# Patient Record
Sex: MALE | Race: WHITE | NOT HISPANIC OR LATINO | URBAN - METROPOLITAN AREA
[De-identification: names, ages, dates, MRNs, and addresses within clinical notes are randomized per-mention and may not be internally consistent; named-entity substitution may affect disease eponyms.]

---

## 2017-07-11 ENCOUNTER — OFFICE VISIT - RIVER FALLS (OUTPATIENT)
Dept: FAMILY MEDICINE | Facility: CLINIC | Age: 22
End: 2017-07-11

## 2017-07-11 ASSESSMENT — MIFFLIN-ST. JEOR: SCORE: 1663.71

## 2018-11-27 ENCOUNTER — OFFICE VISIT - RIVER FALLS (OUTPATIENT)
Dept: FAMILY MEDICINE | Facility: CLINIC | Age: 23
End: 2018-11-27

## 2019-04-22 ENCOUNTER — OFFICE VISIT - RIVER FALLS (OUTPATIENT)
Dept: FAMILY MEDICINE | Facility: CLINIC | Age: 24
End: 2019-04-22

## 2019-04-22 ASSESSMENT — MIFFLIN-ST. JEOR: SCORE: 1725.39

## 2019-05-02 ENCOUNTER — OFFICE VISIT - RIVER FALLS (OUTPATIENT)
Dept: FAMILY MEDICINE | Facility: CLINIC | Age: 24
End: 2019-05-02

## 2019-05-02 ASSESSMENT — MIFFLIN-ST. JEOR: SCORE: 1722.67

## 2019-09-19 ENCOUNTER — OFFICE VISIT - RIVER FALLS (OUTPATIENT)
Dept: FAMILY MEDICINE | Facility: CLINIC | Age: 24
End: 2019-09-19

## 2019-09-19 ASSESSMENT — MIFFLIN-ST. JEOR: SCORE: 1786.18

## 2022-02-11 VITALS
BODY MASS INDEX: 23.42 KG/M2 | HEIGHT: 70 IN | SYSTOLIC BLOOD PRESSURE: 120 MMHG | HEIGHT: 70 IN | SYSTOLIC BLOOD PRESSURE: 118 MMHG | TEMPERATURE: 99.3 F | BODY MASS INDEX: 23.34 KG/M2 | WEIGHT: 163 LBS | TEMPERATURE: 97 F | HEART RATE: 76 BPM | WEIGHT: 163.6 LBS | DIASTOLIC BLOOD PRESSURE: 74 MMHG | DIASTOLIC BLOOD PRESSURE: 84 MMHG | HEART RATE: 62 BPM | OXYGEN SATURATION: 98 %

## 2022-02-12 VITALS
SYSTOLIC BLOOD PRESSURE: 118 MMHG | DIASTOLIC BLOOD PRESSURE: 60 MMHG | WEIGHT: 177 LBS | TEMPERATURE: 98.2 F | HEIGHT: 70 IN | BODY MASS INDEX: 25.34 KG/M2 | OXYGEN SATURATION: 98 % | RESPIRATION RATE: 16 BRPM | HEART RATE: 72 BPM

## 2022-02-12 VITALS
HEIGHT: 70 IN | WEIGHT: 150 LBS | BODY MASS INDEX: 21.47 KG/M2 | DIASTOLIC BLOOD PRESSURE: 60 MMHG | HEART RATE: 60 BPM | RESPIRATION RATE: 16 BRPM | TEMPERATURE: 97.5 F | SYSTOLIC BLOOD PRESSURE: 116 MMHG

## 2022-02-16 NOTE — PROGRESS NOTES
Patient:   ROBERTH CAPONE            MRN: [id number]            FIN: [id number]               Age:   23 years     Sex:  Male     :  1995   Associated Diagnoses:   Acute bronchospasm   Author:   Koko Brown MD      Chief Complaint   2019 6:35 PM CDT    c/o dry cough x 3 weeks month. Started after having flu like symptoms .      History of Present Illness             The patient presents with cough.  The cough is described as dry.  The severity of the cough is mild.  The cough has lasted for 3 week(s).  The context of the cough: occurred after domestic travel.  Associated symptoms consist of chills, fever, nasal drainage and denies sore throat.  The first three days symptoms started he had nausea, and  fevers but these symptoms have since resolved..        Review of Systems   Respiratory:  Cough.       Health Status   Allergies:    Allergic Reactions (Selected)  No known allergies   Medications:  (Selected)      Problem list:    All Problems  Resolved: Lyme disease / SNOMED CT [id number]      Histories   Past Medical History:    Resolved  Lyme disease ([id number]): Onset in the month of 2009 at 13 years  Resolved.   Family History:    Entire family history is negative.   Procedure history:    Hypospadias repair ([id number]) in  at 3 Years.   Social History:        Alcohol Assessment            Never      Tobacco Assessment            Never      Physical Examination   Vital Signs   2019 6:35 PM CDT Temperature Tympanic 99.3 DegF    Peripheral Pulse Rate 62 bpm    Systolic Blood Pressure 118 mmHg    Diastolic Blood Pressure 74 mmHg    Mean Arterial Pressure 89 mmHg      Measurements from flowsheet : Measurements   2019 6:35 PM CDT Height Measured - Standard 69.5 in    Weight Measured - Standard 163.6 lb    BSA 1.91 m2    Body Mass Index 23.81 kg/m2      General:  Alert and oriented, No acute distress.    Eye:  Pupils are equal, round and  reactive to light, Normal conjunctiva.    HENT:  Oral mucosa is moist.    Neck:  Supple.    Respiratory:  Cough.         Respirations: Are within normal limits.    Cardiovascular:  Normal rate, Regular rhythm, No edema.    Gastrointestinal:  Non-distended.    Musculoskeletal:  Normal gait.    Integumentary:  Warm, No rash.    Psychiatric:  Cooperative, Appropriate mood & affect, Normal judgment.       Impression and Plan   Diagnosis     Acute bronchospasm (OBE92-GJ J98.01).     Plan:  Symptoms and exam are consistent with bronchospasm  Discussed potential help from albuterol inhaler.  Aware of need for further evaluation if worse or continued need.  Demonstrated correct technique for use of inhaler; patient education handout provided.   with treat with a zpak.    I, Aditi Solis [facility], acted solely as a scribe for, and in the presence of Dr. Koko Brown who performed the service.

## 2022-02-16 NOTE — NURSING NOTE
Comprehensive Intake Entered On:  9/19/2019 1:02 PM CDT    Performed On:  9/19/2019 12:58 PM CDT by Dimitris Russo CMA               Summary   Chief Complaint :   Pt here for dry cough x 3 weeks and a headache that started this morning.   Menstrual Status :   N/A   Weight Measured :   177 lb(Converted to: 177 lb 0 oz, 80.29 kg)    Height Measured :   69.5 in(Converted to: 5 ft 9 in, 176.53 cm)    Body Mass Index :   25.76 kg/m2 (HI)    Body Surface Area :   1.98 m2   Systolic Blood Pressure :   118 mmHg   Diastolic Blood Pressure :   60 mmHg   Mean Arterial Pressure :   79 mmHg   Peripheral Pulse Rate :   72 bpm   BP Site :   Right arm   Pulse Site :   Radial artery   Temperature Tympanic :   98.2 DegF(Converted to: 36.8 DegC)    Respiratory Rate :   16 br/min   Oxygen Saturation :   98 %   Dimitris Russo CMA - 9/19/2019 12:58 PM CDT   Health Status   Allergies Verified? :   Yes   Medication History Verified? :   Yes   Medical History Verified? :   Yes   Pre-Visit Planning Status :   Not completed   Tobacco Use? :   Never smoker   Dimitris Russo CMA - 9/19/2019 12:58 PM CDT   Meds / Allergies   (As Of: 9/19/2019 1:02:34 PM CDT)   Allergies (Active)   No known allergies  Estimated Onset Date:   Unspecified ; Created By:   Maddie Butts; Reaction Status:   Active ; Category:   Drug ; Substance:   No known allergies ; Type:   Allergy ; Updated By:   Maddie Butts; Reviewed Date:   9/19/2019 1:00 PM CDT        Medication List   (As Of: 9/19/2019 1:02:34 PM CDT)   Prescription/Discharge Order    fluticasone  :   fluticasone ; Status:   Processing ; Ordered As Mnemonic:   Flovent  mcg/inh inhalation aerosol ; Ordering Provider:   Yodit Estrada PA-C; Action Display:   Complete ; Catalog Code:   fluticasone ; Order Dt/Tm:   9/19/2019 1:00:37 PM          albuterol  :   albuterol ; Status:   Processing ; Ordered As Mnemonic:   albuterol 90 mcg/inh inhalation aerosol ; Ordering Provider:   Stephanie EMERY,  Koko; Action Display:   Complete ; Catalog Code:   albuterol ; Order Dt/Tm:   9/19/2019 1:00:40 PM            Social History   Social History   (As Of: 9/19/2019 1:02:34 PM CDT)   Alcohol:        Never   (Last Updated: 7/26/2013 11:47:21 AM CDT by Kingston Cherry PA-C)          Tobacco:        Never   (Last Updated: 7/26/2013 11:47:25 AM CDT by [place] Cherry PA-C)

## 2022-02-16 NOTE — NURSING NOTE
Comprehensive Intake Entered On:  5/2/2019 12:06 PM CDT    Performed On:  5/2/2019 11:59 AM CDT by Francisca Truong               Summary   Chief Complaint :   F/u cough. Not any better after abx.    Menstrual Status :   N/A   Weight Measured :   163 lb(Converted to: 163 lb 0 oz, 73.94 kg)    Height Measured :   69.5 in(Converted to: 5 ft 9 in, 176.53 cm)    Body Mass Index :   23.72 kg/m2   Body Surface Area :   1.9 m2   Systolic Blood Pressure :   120 mmHg   Diastolic Blood Pressure :   84 mmHg (HI)    Mean Arterial Pressure :   96 mmHg   Peripheral Pulse Rate :   76 bpm   Temperature Tympanic :   97.0 DegF(Converted to: 36.1 DegC)  (LOW)    Oxygen Saturation :   98 %   Francisca Truong - 5/2/2019 11:59 AM CDT   Health Status   Allergies Verified? :   Yes   Medication History Verified? :   Yes   Medical History Verified? :   Yes   Pre-Visit Planning Status :   Completed   Tobacco Use? :   Never smoker   Francisca Truong - 5/2/2019 11:59 AM CDT   Consents   Consent for Immunization Exchange :   Consent Granted   Consent for Immunizations to Providers :   Consent Granted   Francisca Truong - 5/2/2019 11:59 AM CDT   Meds / Allergies   (As Of: 5/2/2019 12:06:36 PM CDT)   Allergies (Active)   No known allergies  Estimated Onset Date:   Unspecified ; Created By:   Maddie Butts; Reaction Status:   Active ; Category:   Drug ; Substance:   No known allergies ; Type:   Allergy ; Updated By:   Maddie Butts; Reviewed Date:   5/2/2019 12:05 PM CDT        Medication List   (As Of: 5/2/2019 12:06:36 PM CDT)   Prescription/Discharge Order    albuterol  :   albuterol ; Status:   Prescribed ; Ordered As Mnemonic:   albuterol 90 mcg/inh inhalation aerosol ; Simple Display Line:   2 puff(s), INH, QID, 17 g, 0 Refill(s) ; Ordering Provider:   Koko Brown MD; Catalog Code:   albuterol ; Order Dt/Tm:   4/22/2019 6:46:13 PM          azithromycin  :   azithromycin ; Status:   Processing ; Ordered As  Mnemonic:   azithromycin 250 mg oral tablet ; Ordering Provider:   Kook Brown MD; Action Display:   Complete ; Catalog Code:   azithromycin ; Order Dt/Tm:   5/2/2019 12:05:03 PM

## 2022-02-16 NOTE — PROGRESS NOTES
Patient:   ROBERTH CAPONE            MRN: [id number]            FIN: [id number]               Age:   23 years     Sex:  Male     :  1995   Associated Diagnoses:   Post-viral cough syndrome   Author:   Kingston Cherry PA-C      Chief Complaint   2019 12:58 PM CDT   Pt here for dry cough x 3 weeks and a headache that started this morning.      History of Present Illness   Chief complaint and symptoms noted above and confirmed with patient   as above, cough for weeks, now also having a headache  using nyquil  sxs started with 3 day hx of fever, chills, body aches, which resolved but cough has persisted      Review of Systems   Respiratory:  Cough, No sputum production.    Neurologic:  Headache.       Health Status   Allergies:    Allergic Reactions (All)  No known allergies   Medications: not on any regular medications      Histories   Past Medical History:    Resolved  Lyme disease ([id number]): Onset in the month of 2009 at 13 years  Resolved.   Family History:    Entire family history is negative.   Procedure history:    Hypospadias repair ([id number]) in  at 3 Years.      Physical Examination   Vital Signs   2019 12:58 PM CDT Temperature Tympanic 98.2 DegF    Peripheral Pulse Rate 72 bpm    Pulse Site Radial artery    Respiratory Rate 16 br/min    Systolic Blood Pressure 118 mmHg    Diastolic Blood Pressure 60 mmHg    Mean Arterial Pressure 79 mmHg    BP Site Right arm    Oxygen Saturation 98 %      Measurements from flowsheet : Measurements   2019 12:58 PM CDT Height Measured - Standard 69.5 in    Weight Measured - Standard 177 lb    BSA 1.98 m2    Body Mass Index 25.76 kg/m2  HI      General:  No acute distress.    HENT:  No pharyngeal erythema, No sinus tenderness, bilateral impacted cerumenosis; flushed with water and alcohol.  Afterwards canals are free of cerumen, TMs normal., nares are patent.    Neck:  Supple, Non-tender, No lymphadenopathy.    Respiratory:   Lungs are clear to auscultation.    Cardiovascular:  Normal rate, Regular rhythm, No murmur.       Impression and Plan   Diagnosis     Post-viral cough syndrome ([id number] R05).     Summary:  will treat with burst of prednisone and albuterol MDI prn, follow up if not improving.    Orders     Orders   Pharmacy:  albuterol 90 mcg/inh inhalation aerosol (Prescribe): 2 puff(s), INH, q4-6 hrs, PRN: for wheezing, # 17 g, 4 Refill(s), Type: Maintenance, Pharmacy: [facility], 2 puff(s) Inhale q4-6 hrs,PRN:for wheezing  predniSONE 20 mg oral tablet (Prescribe): = 2 tab(s) ( 40 mg ), Oral, daily, x 5 day(s), Instructions: with food or milk, # 10 tab(s), 1 Refill(s), Type: Acute, Pharmacy: [facility], 2 tab(s) Oral daily,x5 day(s),Instr:with food or milk.     Orders   Charges (Evaluation and Management):  [id number] office outpatient visit 15 minutes (Charge) (Order): Quantity: 1, Post-viral cough syndrome.

## 2022-02-16 NOTE — PROGRESS NOTES
Patient:   ROBERTH CAPONE            MRN: [id number]            FIN: [id number]               Age:   21 years     Sex:  Male     :  1995   Associated Diagnoses:   Eustachian tube dysfunction; Otitis externa   Author:   Kingston Cherry PA-C      Chief Complaint   2017 9:33 AM CDT    Pt here for Right ear pain and tinnitus from snorkeling in the [facility] last week.      History of Present Illness   Chief complaint and symptoms noted above and confirmed with patient   as above, he was snorkeling and thinks he did not fully equalize the pressure in his ears  now has right ear pain and tinnitus, no drainage,  no head or sinus congestion  can't get right ear pain         Review of Systems   Constitutional:  Negative.    Ear/Nose/Mouth/Throat:  No nasal congestion        Decreased hearing: On the right.         Ear pain: Right.       Health Status   Allergies:    Allergic Reactions (Selected)  No known allergies   Medications: not on any regular medications      Histories   Past Medical History:    Resolved  Lyme disease ([id number]): Onset in the month of 2009 at 13 years  Resolved.   Family History:    Entire family history is negative.   Procedure history:    Hypospadias repair ([id number]) in  at 3 Years.      Physical Examination   Vital Signs   2017 9:33 AM CDT Temperature Tympanic 97.5 DegF  LOW    Peripheral Pulse Rate 60 bpm    Pulse Site Radial artery    Respiratory Rate 16 br/min    Systolic Blood Pressure 116 mmHg    Diastolic Blood Pressure 60 mmHg    Mean Arterial Pressure 79 mmHg    BP Site Right arm      Measurements from flowsheet : Measurements   2017 9:33 AM CDT Height Measured - Standard 69.5 in    Weight Measured - Standard 150 lb    BSA 1.82 m2    Body Mass Index 21.83 kg/m2      General:  No acute distress.    HENT:  No pharyngeal erythema, No sinus tenderness, nares are patent, left TM and canal are normal  right canal is mostly full of cerumen and  debris,  TM is not visualized because of this, cannot do Valsalva manuever to get right ear to pop.    Neck:  Supple, Non-tender, No lymphadenopathy.       Impression and Plan   Diagnosis     Eustachian tube dysfunction (YJH43-KQ H69.80).     Otitis externa (AAX04-SK H60.90).     Summary:  cannot rule out perforation of TM because of debris in ear canal,  will treat with floxin otic drops and atrovent nasal spray, follow up if not improving.    Orders     Orders   Pharmacy:  Atrovent 42 mcg/inh nasal spray (Prescribe): 2 spray(s), nasal, qid, PRN: for nasal congestion, # 1 EA, 2 Refill(s), Type: Maintenance, Pharmacy: [facility], 2 spray(s) nasal qid,PRN:for nasal congestion  Floxin Otic 0.3% otic solution (Prescribe): 5 drop(s), right ear, BID, x 7 day(s), # 10 mL, 0 Refill(s), Type: Maintenance, Pharmacy: [facility], 5 drop(s) right ear bid,x7 day(s).     Orders   Charges (Evaluation and Management):  [id number] office outpatient visit 15 minutes (Charge) (Order): Quantity: 1, Otitis externa  Eustachian tube dysfunction.

## 2022-02-16 NOTE — PROGRESS NOTES
Chief Complaint    F/u cough. Not any better after abx.  History of Present Illness      Chief complaint as above reviewed and confirmed with patient.  Pt presents to the clinic with concerns re: cough.  Had flu like sx about 1 month ago, cough persisted but fever got better. Seen 4-22-19 for persistent dry cough and treated with albuterol and zithromax.  HE has not noted any improvement.  No sob or wheezing. He does have a hx of asthma.  No problems for at least 5 years if not longer.  No chest pain.  He works as a  and usually wears a mask but not all the time.  Review of Systems      Review of systems is negative with the exception of those noted in HPI          Physical Exam   Vitals & Measurements    T: 97.0   F (Tympanic)  HR: 76(Peripheral)  BP: 120/84  SpO2: 98%     HT: 69.5 in  WT: 163 lb  BMI: 23.72           Vitals as above per nursing documentation           Constitutional : nad appears well          Ears: ears patent B, TMS intact, noninjected           Nose: nasal mucosa is non-ededmatous. no discharge           Throat: pharynx is nonerythematous, no tonsillar hypertrophy, no exudate           Neck: neck supple, no adenopathy, no thyromegaly, no rigidity           Lungs: lungs CTA', no Wheezes, rhonchi or rales           Heart: heart RRR, nl S1, S2 no murmur           skin:  No rashes            CXR: normal chest xray  Assessment/Plan       1. Post-viral cough syndrome (R05), Cough (R05)         continue albuterol. Start prednisone short course and fluticasone for the next 2-4 weeks.  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 may need to ingrid the flovent for a month          Ordered:          XR Chest PA/Lat (Request), Cough                Orders:         fluticasone, = 2 puff(s), Inhale, bid, # 1 EA, 1 Refill(s), Type: Maintenance, Pharmacy: [facility]  [place], 2 puff(s) Inhale bid, (Ordered)         predniSONE, = 2 tab(s) ( 40 mg ), PO, Daily, x 5 day(s), # 10 tab(s), 0 Refill(s), Type: Acute, Pharmacy: [facility] [place], 2 tab(s) Oral daily,x5 day(s), (Ordered)  Patient Information     Name:ROBERTH CAPONE      Address:      [address] [address]     Sex:Male     YOB: 1995     [phone]     Emergency Contact:JAIMIE CAPONE     MRN:[id number]     FIN:[id number]     Location:[facility]     Date of Service:05/02/2019      Primary Care Physician:       Dilip SMITH, [place] HARTLEY, [phone]      Attending Physician:       Yodit Estrada PA-C, [phone]  Problem List/Past Medical History    Ongoing     No qualifying data    Historical     Lyme disease  Procedure/Surgical History     Hypospadias repair (1998)        Medications     albuterol 90 mcg/inh inhalation aerosol: 2 puff(s), INH, QID, 17 g, 0 Refill(s).     predniSONE 20 mg oral tablet: 40 mg, 2 tab(s), PO, Daily, for 5 day(s), 10 tab(s), 0 Refill(s).     Flovent  mcg/inh inhalation aerosol: 2 puff(s), Inhale, bid, 1 EA, 1 Refill(s).      Allergies    No known allergies  Social History    Smoking Status - 05/02/2019     Never smoker     Alcohol      Never, 07/26/2013     Tobacco      Never, 07/26/2013  Family History    Family history is negative  Immunizations      Vaccine Date Status      meningococcal conjugate vaccine 05/26/2015 Given      influenza 01/21/2011 Recorded      influenza, H1N1, inactivated 12/15/2009 Recorded      Hep A, pediatric/adolescent 11/11/2009 Recorded      influenza 11/11/2009 Recorded      meningococcal conjugate vaccine 02/27/2009 Recorded      Hep A, pediatric/adolescent 02/27/2009 Recorded      influenza 02/27/2009 Recorded      varicella 10/07/2008 Recorded      tetanus/diphth/pertuss (Tdap) adult/adol 10/07/2008 Recorded      varicella 09/01/2008 Recorded      tetanus/diphth/pertuss  (Tdap) adult/adol 09/01/2008 Recorded      influenza 01/25/2007 Recorded      DTaP 01/05/2001 Recorded      MMR (measles/mumps/rubella) 01/05/2001 Recorded      IPV 01/05/2001 Recorded      DTaP 04/16/1998 Recorded      MMR (measles/mumps/rubella) 04/16/1998 Recorded      varicella 01/09/1998 Recorded      DTaP 07/11/1997 Recorded      hepatitis B pediatric vaccine 07/11/1997 Recorded      IPV 07/11/1997 Recorded      Hib (HbOC) 07/11/1997 Recorded      DTaP 05/01/1997 Recorded      IPV 05/01/1997 Recorded      Hib (HbOC) 05/01/1997 Recorded      DTaP 03/06/1997 Recorded      hepatitis B pediatric vaccine 03/06/1997 Recorded      IPV 03/06/1997 Recorded      Hib (HbOC) 03/06/1997 Recorded      hepatitis B pediatric vaccine 01/02/1997 Recorded

## 2022-02-16 NOTE — NURSING NOTE
Comprehensive Intake Entered On:  4/22/2019 6:38 PM CDT    Performed On:  4/22/2019 6:35 PM CDT by Nhung Solis CMA               Summary   Chief Complaint :   c/o dry cough x 3 weeks month. Started after having flu like symptoms .   Menstrual Status :   N/A   Weight Measured :   163.6 lb(Converted to: 163 lb 10 oz, 74.21 kg)    Height Measured :   69.5 in(Converted to: 5 ft 9 in, 176.53 cm)    Body Mass Index :   23.81 kg/m2   Body Surface Area :   1.91 m2   Systolic Blood Pressure :   118 mmHg   Diastolic Blood Pressure :   74 mmHg   Mean Arterial Pressure :   89 mmHg   Peripheral Pulse Rate :   62 bpm   Temperature Tympanic :   99.3 DegF(Converted to: 37.4 DegC)    Nhung Solis CMA - 4/22/2019 6:35 PM CDT   Health Status   Allergies Verified? :   Yes   Medication History Verified? :   Yes   Pre-Visit Planning Status :   Completed   Nhung Solis CMA - 4/22/2019 6:35 PM CDT   Consents   Consent for Immunization Exchange :   Consent Granted   Consent for Immunizations to Providers :   Consent Granted   Nhung Solis CMA - 4/22/2019 6:35 PM CDT   Meds / Allergies   (As Of: 4/22/2019 6:38:54 PM CDT)   Allergies (Active)   No known allergies  Estimated Onset Date:   Unspecified ; Created By:   Maddie Butts; Reaction Status:   Active ; Category:   Drug ; Substance:   No known allergies ; Type:   Allergy ; Updated By:   Maddie Butts; Reviewed Date:   7/11/2017 9:51 AM CDT        Medication List   (As Of: 4/22/2019 6:38:54 PM CDT)   Prescription/Discharge Order    ipratropium nasal  :   ipratropium nasal ; Status:   Processing ; Ordered As Mnemonic:   Atrovent 42 mcg/inh nasal spray ; Ordering Provider:   Kingston Cherry PA-C; Action Display:   Complete ; Catalog Code:   ipratropium nasal ; Order Dt/Tm:   4/22/2019 6:37:33 PM          ofloxacin otic  :   ofloxacin otic ; Status:   Processing ; Ordered As Mnemonic:   Floxin Otic 0.3% otic solution ; Ordering Provider:   Dilip SMITH  [place] HARTLEY; Action Display:   Complete ; Catalog Code:   ofloxacin otic ; Order Dt/Tm:   4/22/2019 6:37:33 PM